# Patient Record
Sex: MALE | Race: WHITE | Employment: FULL TIME | URBAN - METROPOLITAN AREA
[De-identification: names, ages, dates, MRNs, and addresses within clinical notes are randomized per-mention and may not be internally consistent; named-entity substitution may affect disease eponyms.]

---

## 2021-02-24 ENCOUNTER — APPOINTMENT (OUTPATIENT)
Dept: GENERAL RADIOLOGY | Age: 41
End: 2021-02-24
Attending: STUDENT IN AN ORGANIZED HEALTH CARE EDUCATION/TRAINING PROGRAM
Payer: OTHER MISCELLANEOUS

## 2021-02-24 ENCOUNTER — HOSPITAL ENCOUNTER (EMERGENCY)
Age: 41
Discharge: HOME | End: 2021-02-24
Attending: STUDENT IN AN ORGANIZED HEALTH CARE EDUCATION/TRAINING PROGRAM
Payer: OTHER MISCELLANEOUS

## 2021-02-24 VITALS
HEIGHT: 73 IN | OXYGEN SATURATION: 99 % | WEIGHT: 225 LBS | TEMPERATURE: 98 F | SYSTOLIC BLOOD PRESSURE: 123 MMHG | BODY MASS INDEX: 29.82 KG/M2 | RESPIRATION RATE: 16 BRPM | DIASTOLIC BLOOD PRESSURE: 78 MMHG | HEART RATE: 87 BPM

## 2021-02-24 DIAGNOSIS — S68.119A FINGER AMPUTATION, TRAUMATIC, INITIAL ENCOUNTER: Primary | ICD-10-CM

## 2021-02-24 DIAGNOSIS — S62.634B OPEN DISPLACED FRACTURE OF DISTAL PHALANX OF RIGHT RING FINGER, INITIAL ENCOUNTER: ICD-10-CM

## 2021-02-24 PROCEDURE — 99284 EMERGENCY DEPT VISIT MOD MDM: CPT

## 2021-02-24 PROCEDURE — 73130 X-RAY EXAM OF HAND: CPT | Performed by: STUDENT IN AN ORGANIZED HEALTH CARE EDUCATION/TRAINING PROGRAM

## 2021-02-24 PROCEDURE — 96361 HYDRATE IV INFUSION ADD-ON: CPT

## 2021-02-24 PROCEDURE — 96375 TX/PRO/DX INJ NEW DRUG ADDON: CPT

## 2021-02-24 PROCEDURE — 90471 IMMUNIZATION ADMIN: CPT

## 2021-02-24 PROCEDURE — 96374 THER/PROPH/DIAG INJ IV PUSH: CPT

## 2021-02-24 RX ORDER — HYDROCODONE BITARTRATE AND ACETAMINOPHEN 5; 325 MG/1; MG/1
2 TABLET ORAL ONCE
Status: COMPLETED | OUTPATIENT
Start: 2021-02-24 | End: 2021-02-24

## 2021-02-24 RX ORDER — CEPHALEXIN 500 MG/1
500 CAPSULE ORAL 4 TIMES DAILY
Qty: 40 CAPSULE | Refills: 0 | Status: SHIPPED | OUTPATIENT
Start: 2021-02-24 | End: 2021-03-06

## 2021-02-24 RX ORDER — 0.9 % SODIUM CHLORIDE 0.9 %
INTRAVENOUS SOLUTION INTRAVENOUS
Status: COMPLETED
Start: 2021-02-24 | End: 2021-02-24

## 2021-02-24 RX ORDER — AMLODIPINE BESYLATE 5 MG/1
5 TABLET ORAL DAILY
COMMUNITY
End: 2021-02-25

## 2021-02-24 RX ORDER — HYDROCODONE BITARTRATE AND ACETAMINOPHEN 5; 325 MG/1; MG/1
1-2 TABLET ORAL EVERY 6 HOURS PRN
Qty: 10 TABLET | Refills: 0 | Status: SHIPPED | OUTPATIENT
Start: 2021-02-24 | End: 2021-03-03

## 2021-02-24 RX ORDER — MORPHINE SULFATE 4 MG/ML
4 INJECTION, SOLUTION INTRAMUSCULAR; INTRAVENOUS ONCE
Status: DISCONTINUED | OUTPATIENT
Start: 2021-02-24 | End: 2021-02-24

## 2021-02-24 RX ORDER — MORPHINE SULFATE 4 MG/ML
8 INJECTION, SOLUTION INTRAMUSCULAR; INTRAVENOUS ONCE
Status: COMPLETED | OUTPATIENT
Start: 2021-02-24 | End: 2021-02-24

## 2021-02-24 RX ORDER — CEFAZOLIN SODIUM 1 G/3ML
INJECTION, POWDER, FOR SOLUTION INTRAMUSCULAR; INTRAVENOUS
Status: COMPLETED
Start: 2021-02-24 | End: 2021-02-24

## 2021-02-24 RX ORDER — ONDANSETRON 2 MG/ML
4 INJECTION INTRAMUSCULAR; INTRAVENOUS ONCE
Status: COMPLETED | OUTPATIENT
Start: 2021-02-24 | End: 2021-02-24

## 2021-02-24 NOTE — ED INITIAL ASSESSMENT (HPI)
4th and 5th digit on r hand got caught between metal coils- avulsion of portion of 5th digit- large/deep lacs to r 4th digit

## 2021-02-24 NOTE — ED NOTES
Supervisor here and drug screening is required per stat list. Spoke with mobile solutions and info given. Will call back with ETA.

## 2021-02-24 NOTE — ED PROVIDER NOTES
Patient Seen in: THE [facility] In [place]      History   Patient presents with:  Trauma    Stated Complaint: right 4th and 5th finger  partial amputation    HPI/Subjective:   HPI    Patient is a 42-year-old male presenting the emergency Extremities: Right hand with amputation of the distal fifth digit and significant maceration to the distal fourth digit with the distal component being insensate. Minimal oozing, no arterial bleeding.           ED Course   Labs Reviewed - No data to displa Extensive differential was considered. Imaging was obtained. Case discussed with Ortho, they recommend the patient follow-up with Dr. Luanne Greco, hand surgery in the office. We will likely get him in tomorrow. Patient given phone number.   They agreed with pr

## 2021-02-25 ENCOUNTER — ANESTHESIA EVENT (OUTPATIENT)
Dept: SURGERY | Facility: HOSPITAL | Age: 41
End: 2021-02-25
Payer: OTHER MISCELLANEOUS

## 2021-02-25 ENCOUNTER — OFFICE VISIT (OUTPATIENT)
Dept: ORTHOPEDICS CLINIC | Facility: CLINIC | Age: 41
End: 2021-02-25

## 2021-02-25 VITALS — OXYGEN SATURATION: 99 % | HEART RATE: 81 BPM

## 2021-02-25 DIAGNOSIS — S68.119A AMPUTATION OF TIP OF FINGER, INITIAL ENCOUNTER: Primary | ICD-10-CM

## 2021-02-25 PROCEDURE — 99204 OFFICE O/P NEW MOD 45 MIN: CPT | Performed by: ORTHOPAEDIC SURGERY

## 2021-02-25 RX ORDER — ATORVASTATIN CALCIUM 20 MG/1
20 TABLET, FILM COATED ORAL NIGHTLY
COMMUNITY

## 2021-02-25 RX ORDER — HYDROCODONE BITARTRATE AND ACETAMINOPHEN 5; 325 MG/1; MG/1
1 TABLET ORAL EVERY 6 HOURS PRN
Qty: 30 TABLET | Refills: 0 | Status: SHIPPED | OUTPATIENT
Start: 2021-02-25 | End: 2021-03-08

## 2021-02-25 RX ORDER — ACETAMINOPHEN 500 MG
1000 TABLET ORAL ONCE
Status: CANCELLED | OUTPATIENT
Start: 2021-02-25 | End: 2021-02-25

## 2021-02-25 RX ORDER — AMLODIPINE BESYLATE AND BENAZEPRIL HYDROCHLORIDE 10; 20 MG/1; MG/1
1 CAPSULE ORAL DAILY
COMMUNITY

## 2021-02-25 NOTE — H&P (VIEW-ONLY)
[facility] New Patient Note    CC: Right ring finger and small finger injury    DOI: 2/24/2021    HPI: This 35year old male with right ring finger and small finger injury while at work. He works at a [employer]. He works as a . Head: Normocephalic. Eyes: Pupils are equal, round, and reactive to light. Conjunctivae and EOM are normal. No scleral icterus. Neck: Normal range of motion. No obvious masses  Cardiovascular: Normal rate and intact distal pulses.    Pulmonary/Chest: Ef 35year old male with right ring finger nailbed injury and distal phalanx fracture. I think the amount of hematoma underneath the nail plate warrants nail plate removal and repair of a nailbed laceration.   He also has a complex laceration over the volar t

## 2021-02-25 NOTE — PROGRESS NOTES
Baldomero Bills III's case has been scheduled/rescheduled at [phone] on 2/26/2021 at [facility]  Received:  Today  Message Contents   Naye Gimenez [place], [place]             Patient Name: Kaia Gray  MRN: [id number]     [place]

## 2021-02-25 NOTE — PROGRESS NOTES
Surgeon:Dr. Leslee العراقي   Date of Surgery:2/26/21  Facility: [facility]       [facility], scheduling sheet to referral team?:N/A  Clearance needed:No        If yes, requested? :N/A  Post-op Appt:3/8/21 1142

## 2021-02-25 NOTE — PROGRESS NOTES
OR BOOKING SHEET  Name: Leslee Briceño III  MRN: [id number]   : 1980  Diagnosis:  [x] Amputation of tip of finger, initial encounter [B98.390F]    Disposition:    [x] Outpatient  [] Overnight for BRITNEY  [] Overnight for observation and pain contr

## 2021-02-26 ENCOUNTER — HOSPITAL ENCOUNTER (OUTPATIENT)
Facility: HOSPITAL | Age: 41
Setting detail: HOSPITAL OUTPATIENT SURGERY
Discharge: HOME | End: 2021-02-26
Attending: ORTHOPAEDIC SURGERY | Admitting: ORTHOPAEDIC SURGERY
Payer: OTHER MISCELLANEOUS

## 2021-02-26 ENCOUNTER — LAB ENCOUNTER (OUTPATIENT)
Dept: LAB | Age: 41
End: 2021-02-26
Attending: ORTHOPAEDIC SURGERY
Payer: OTHER MISCELLANEOUS

## 2021-02-26 ENCOUNTER — ANESTHESIA (OUTPATIENT)
Dept: SURGERY | Facility: HOSPITAL | Age: 41
End: 2021-02-26
Payer: OTHER MISCELLANEOUS

## 2021-02-26 VITALS
BODY MASS INDEX: 30.68 KG/M2 | DIASTOLIC BLOOD PRESSURE: 89 MMHG | WEIGHT: 231.5 LBS | RESPIRATION RATE: 13 BRPM | OXYGEN SATURATION: 95 % | HEIGHT: 73 IN | SYSTOLIC BLOOD PRESSURE: 125 MMHG | TEMPERATURE: 97 F | HEART RATE: 79 BPM

## 2021-02-26 DIAGNOSIS — Z01.818 PRE-OP TESTING: Primary | ICD-10-CM

## 2021-02-26 DIAGNOSIS — S68.119A AMPUTATION OF TIP OF FINGER, INITIAL ENCOUNTER: ICD-10-CM

## 2021-02-26 DIAGNOSIS — Z01.818 PRE-OP TESTING: ICD-10-CM

## 2021-02-26 LAB
ANION GAP SERPL CALC-SCNC: 5 MMOL/L (ref 0–18)
ATRIAL RATE: 74 BPM
BUN BLD-MCNC: 13 MG/DL (ref 7–18)
BUN/CREAT SERPL: 13.1 (ref 10–20)
CALCIUM BLD-MCNC: 9.4 MG/DL (ref 8.5–10.1)
CHLORIDE SERPL-SCNC: 105 MMOL/L (ref 98–112)
CO2 SERPL-SCNC: 28 MMOL/L (ref 21–32)
CREAT BLD-MCNC: 0.99 MG/DL
GLUCOSE BLD-MCNC: 89 MG/DL (ref 70–99)
OSMOLALITY SERPL CALC.SUM OF ELEC: 286 MOSM/KG (ref 275–295)
P AXIS: 45 DEGREES
P-R INTERVAL: 166 MS
POTASSIUM SERPL-SCNC: 3.9 MMOL/L (ref 3.5–5.1)
Q-T INTERVAL: 372 MS
QRS DURATION: 94 MS
QTC CALCULATION (BEZET): 412 MS
R AXIS: 9 DEGREES
SARS-COV-2 RNA RESP QL NAA+PROBE: NOT DETECTED
SODIUM SERPL-SCNC: 138 MMOL/L (ref 136–145)
T AXIS: 26 DEGREES
VENTRICULAR RATE: 74 BPM

## 2021-02-26 PROCEDURE — 0PBT0ZZ EXCISION OF RIGHT FINGER PHALANX, OPEN APPROACH: ICD-10-PCS | Performed by: ORTHOPAEDIC SURGERY

## 2021-02-26 PROCEDURE — 11012 DEB SKIN BONE AT FX SITE: CPT | Performed by: ORTHOPAEDIC SURGERY

## 2021-02-26 PROCEDURE — 26765 TREAT FINGER FRACTURE EACH: CPT | Performed by: ORTHOPAEDIC SURGERY

## 2021-02-26 PROCEDURE — 12002 RPR S/N/AX/GEN/TRNK2.6-7.5CM: CPT | Performed by: ORTHOPAEDIC SURGERY

## 2021-02-26 PROCEDURE — 14040 TIS TRNFR F/C/C/M/N/A/G/H/F: CPT | Performed by: ORTHOPAEDIC SURGERY

## 2021-02-26 PROCEDURE — 0HXFXZZ TRANSFER RIGHT HAND SKIN, EXTERNAL APPROACH: ICD-10-PCS | Performed by: ORTHOPAEDIC SURGERY

## 2021-02-26 PROCEDURE — 0HQQXZZ REPAIR FINGER NAIL, EXTERNAL APPROACH: ICD-10-PCS | Performed by: ORTHOPAEDIC SURGERY

## 2021-02-26 PROCEDURE — 11760 REPAIR OF NAIL BED: CPT | Performed by: ORTHOPAEDIC SURGERY

## 2021-02-26 RX ORDER — ONDANSETRON 2 MG/ML
4 INJECTION INTRAMUSCULAR; INTRAVENOUS AS NEEDED
Status: DISCONTINUED | OUTPATIENT
Start: 2021-02-26 | End: 2021-02-26

## 2021-02-26 RX ORDER — SODIUM CHLORIDE, SODIUM LACTATE, POTASSIUM CHLORIDE, CALCIUM CHLORIDE 600; 310; 30; 20 MG/100ML; MG/100ML; MG/100ML; MG/100ML
INJECTION, SOLUTION INTRAVENOUS CONTINUOUS
Status: DISCONTINUED | OUTPATIENT
Start: 2021-02-26 | End: 2021-02-26

## 2021-02-26 RX ORDER — HYDROCODONE BITARTRATE AND ACETAMINOPHEN 5; 325 MG/1; MG/1
2 TABLET ORAL AS NEEDED
Status: COMPLETED | OUTPATIENT
Start: 2021-02-26 | End: 2021-02-26

## 2021-02-26 RX ORDER — NALOXONE HYDROCHLORIDE 0.4 MG/ML
80 INJECTION, SOLUTION INTRAMUSCULAR; INTRAVENOUS; SUBCUTANEOUS AS NEEDED
Status: DISCONTINUED | OUTPATIENT
Start: 2021-02-26 | End: 2021-02-26

## 2021-02-26 RX ORDER — HYDROMORPHONE HYDROCHLORIDE 1 MG/ML
0.4 INJECTION, SOLUTION INTRAMUSCULAR; INTRAVENOUS; SUBCUTANEOUS EVERY 5 MIN PRN
Status: DISCONTINUED | OUTPATIENT
Start: 2021-02-26 | End: 2021-02-26

## 2021-02-26 RX ORDER — HYDROCODONE BITARTRATE AND ACETAMINOPHEN 5; 325 MG/1; MG/1
1 TABLET ORAL AS NEEDED
Status: COMPLETED | OUTPATIENT
Start: 2021-02-26 | End: 2021-02-26

## 2021-02-26 RX ORDER — METOCLOPRAMIDE HYDROCHLORIDE 5 MG/ML
10 INJECTION INTRAMUSCULAR; INTRAVENOUS AS NEEDED
Status: DISCONTINUED | OUTPATIENT
Start: 2021-02-26 | End: 2021-02-26

## 2021-02-26 RX ORDER — MIDAZOLAM HYDROCHLORIDE 1 MG/ML
INJECTION INTRAMUSCULAR; INTRAVENOUS AS NEEDED
Status: DISCONTINUED | OUTPATIENT
Start: 2021-02-26 | End: 2021-02-26 | Stop reason: SURG

## 2021-02-26 RX ORDER — ACETAMINOPHEN 500 MG
1000 TABLET ORAL EVERY 6 HOURS PRN
COMMUNITY

## 2021-02-26 RX ORDER — GLYCOPYRROLATE 0.2 MG/ML
INJECTION, SOLUTION INTRAMUSCULAR; INTRAVENOUS AS NEEDED
Status: DISCONTINUED | OUTPATIENT
Start: 2021-02-26 | End: 2021-02-26 | Stop reason: SURG

## 2021-02-26 RX ORDER — CEFAZOLIN SODIUM/WATER 2 G/20 ML
2 SYRINGE (ML) INTRAVENOUS ONCE
Status: COMPLETED | OUTPATIENT
Start: 2021-02-26 | End: 2021-02-26

## 2021-02-26 RX ORDER — LIDOCAINE HYDROCHLORIDE 10 MG/ML
INJECTION, SOLUTION INFILTRATION; PERINEURAL AS NEEDED
Status: DISCONTINUED | OUTPATIENT
Start: 2021-02-26 | End: 2021-02-26 | Stop reason: HOSPADM

## 2021-02-26 RX ORDER — LIDOCAINE HYDROCHLORIDE 10 MG/ML
INJECTION, SOLUTION EPIDURAL; INFILTRATION; INTRACAUDAL; PERINEURAL AS NEEDED
Status: DISCONTINUED | OUTPATIENT
Start: 2021-02-26 | End: 2021-02-26 | Stop reason: SURG

## 2021-02-26 RX ORDER — BUPIVACAINE HYDROCHLORIDE 5 MG/ML
INJECTION, SOLUTION EPIDURAL; INTRACAUDAL AS NEEDED
Status: DISCONTINUED | OUTPATIENT
Start: 2021-02-26 | End: 2021-02-26 | Stop reason: HOSPADM

## 2021-02-26 RX ORDER — ACETAMINOPHEN 500 MG
1000 TABLET ORAL ONCE AS NEEDED
Status: DISCONTINUED | OUTPATIENT
Start: 2021-02-26 | End: 2021-02-26

## 2021-02-26 RX ADMIN — MIDAZOLAM HYDROCHLORIDE 2 MG: 1 INJECTION INTRAMUSCULAR; INTRAVENOUS at 14:26:00

## 2021-02-26 RX ADMIN — LIDOCAINE HYDROCHLORIDE 50 MG: 10 INJECTION, SOLUTION EPIDURAL; INFILTRATION; INTRACAUDAL; PERINEURAL at 14:27:00

## 2021-02-26 RX ADMIN — GLYCOPYRROLATE 0.1 MG: 0.2 INJECTION, SOLUTION INTRAMUSCULAR; INTRAVENOUS at 14:27:00

## 2021-02-26 RX ADMIN — CEFAZOLIN SODIUM/WATER 2 G: 2 G/20 ML SYRINGE (ML) INTRAVENOUS at 14:30:00

## 2021-02-26 RX ADMIN — SODIUM CHLORIDE, SODIUM LACTATE, POTASSIUM CHLORIDE, CALCIUM CHLORIDE: 600; 310; 30; 20 INJECTION, SOLUTION INTRAVENOUS at 16:05:00

## 2021-02-26 NOTE — ANESTHESIA POSTPROCEDURE EVALUATION
[facility] [place] Patient Status:  Hospital Outpatient Surgery   Age/Gender 35year old male MRN [id number]   Location [address] [place] [address] Attending Kami Rizo MD   Hosp Day # 0 PCP PHYSICIAN BUDDYTAFF       Luc

## 2021-02-26 NOTE — OPERATIVE REPORT
Operative Note    Patient Name: Reynaldo Vidal III    Preoperative Diagnosis:     1. Right ring fingertip injury  2.  Right small finger amputation    Postoperative Diagnosis: same    Primary Surgeon: Maya Horn    Assistant: None    Procedures:     R Procedure: Patient was met in the preoperative holding area where consent was verified, H&P was updated, and the digits were marked with the surgeon's initial.  Patient was brought to the operating room placed in supine position with a right arm table.   [place] [employer] Next, the finger tourniquet was placed onto the small finger. The fingertip was irrigated and debrided of any hematoma and any devitalized tissue was sharply excised with a 15 blade. A rongeur was used to rongeur back any visible bone.   There was a nailb

## 2021-02-26 NOTE — BRIEF OP NOTE
Pre-Operative Diagnosis: RIGHT RING AND SMALL FIINGER INJURY     Post-Operative Diagnosis: RIGHT RING AND SMALL FINGER INJURY      Procedure Performed:   Procedure(s):  RIGHT RING FINGER NAIL PLATE REMOVAL, NAILBED REPAIR, RIGHT RING FINGER IRRIGATION AND

## 2021-02-26 NOTE — ANESTHESIA PREPROCEDURE EVALUATION
PRE-OP EVALUATION    Patient Name: Ana Kerns III    Pre-op Diagnosis: Amputation of tip of finger, initial encounter [S68.593A]    Procedure(s):  RIGHT RING FINGER NAIL PLATE REMOVAL, NAILBED REPAIR, AND LACERATION REPAIR, RIGHT RING FINGER Elena Gillis patient. The consent was signed without further questions.        Present on Admission:  **None**

## 2021-02-26 NOTE — ANESTHESIA PREPROCEDURE EVALUATION
PRE-OP EVALUATION    Patient Name: Toyin Hammond III    Pre-op Diagnosis: RIGHT RING AND SMALL FIINGER INJURY    Procedure(s):  RIGHT RING FINGER NAIL PLATE REMOVAL, NAILBED REPAIR, AND LACERATION REPAIR, RIGHT RING FINGER IRRIGATION AND DEBRIDEMENT T 0600    •  amLODIPine Besy-Benazepril HCl 10-20 MG Oral Cap, Take 1 capsule by mouth daily. , Disp: , Rfl: , 2/26/2021 at 0600    •  HYDROcodone-acetaminophen (NORCO) 5-325 MG Oral Tab, Take 1 tablet by mouth every 6 (six) hours as needed for Pain., Disp: 3 opening: 3 FB  TM distance: 4 - 6 cm  Neck ROM: full Cardiovascular      Rhythm: regular  Rate: normal     Dental             Pulmonary      Breath sounds clear to auscultation bilaterally.                Other findings            ASA: 2   Plan: MAC  NPO st

## 2021-03-08 ENCOUNTER — TELEPHONE (OUTPATIENT)
Dept: ORTHOPEDICS CLINIC | Facility: CLINIC | Age: 41
End: 2021-03-08

## 2021-03-08 ENCOUNTER — OFFICE VISIT (OUTPATIENT)
Dept: ORTHOPEDICS CLINIC | Facility: CLINIC | Age: 41
End: 2021-03-08
Payer: OTHER MISCELLANEOUS

## 2021-03-08 VITALS — HEART RATE: 76 BPM | OXYGEN SATURATION: 100 %

## 2021-03-08 DIAGNOSIS — S68.119A AMPUTATION OF TIP OF FINGER, INITIAL ENCOUNTER: Primary | ICD-10-CM

## 2021-03-08 PROCEDURE — 99024 POSTOP FOLLOW-UP VISIT: CPT | Performed by: ORTHOPAEDIC SURGERY

## 2021-03-08 RX ORDER — SULFAMETHOXAZOLE AND TRIMETHOPRIM 800; 160 MG/1; MG/1
1 TABLET ORAL 2 TIMES DAILY
Qty: 14 TABLET | Refills: 0 | Status: SHIPPED | OUTPATIENT
Start: 2021-03-08 | End: 2021-03-15

## 2021-03-08 RX ORDER — HYDROCODONE BITARTRATE AND ACETAMINOPHEN 5; 325 MG/1; MG/1
1 TABLET ORAL EVERY 6 HOURS PRN
Qty: 20 TABLET | Refills: 0 | Status: SHIPPED | OUTPATIENT
Start: 2021-03-08

## 2021-03-08 NOTE — PROGRESS NOTES
[facility] Visit    A/P: 35year old male s/p     Right Ring Finger  1. Closed treatment of right ring finger distal phalanx fracture  2. Irrigation and debridement of right ring finger distal phalanx fracture  3.   Right ring finger nail pl

## 2021-03-15 ENCOUNTER — OFFICE VISIT (OUTPATIENT)
Dept: ORTHOPEDICS CLINIC | Facility: CLINIC | Age: 41
End: 2021-03-15
Payer: OTHER MISCELLANEOUS

## 2021-03-15 VITALS — HEART RATE: 88 BPM | OXYGEN SATURATION: 98 %

## 2021-03-15 DIAGNOSIS — S68.119A AMPUTATION OF TIP OF FINGER, INITIAL ENCOUNTER: Primary | ICD-10-CM

## 2021-03-15 PROCEDURE — 99024 POSTOP FOLLOW-UP VISIT: CPT | Performed by: ORTHOPAEDIC SURGERY

## 2021-03-15 RX ORDER — HYDROCODONE BITARTRATE AND ACETAMINOPHEN 5; 325 MG/1; MG/1
1 TABLET ORAL EVERY 6 HOURS PRN
Qty: 30 TABLET | Refills: 0 | Status: SHIPPED | OUTPATIENT
Start: 2021-03-15

## 2021-04-01 ENCOUNTER — MED REC SCAN ONLY (OUTPATIENT)
Dept: ORTHOPEDICS CLINIC | Facility: CLINIC | Age: 41
End: 2021-04-01

## 2021-04-05 ENCOUNTER — OFFICE VISIT (OUTPATIENT)
Dept: ORTHOPEDICS CLINIC | Facility: CLINIC | Age: 41
End: 2021-04-05
Payer: OTHER MISCELLANEOUS

## 2021-04-05 VITALS — OXYGEN SATURATION: 99 % | HEART RATE: 82 BPM

## 2021-04-05 DIAGNOSIS — S68.119A AMPUTATION OF TIP OF FINGER, INITIAL ENCOUNTER: Primary | ICD-10-CM

## 2021-04-05 PROCEDURE — 99024 POSTOP FOLLOW-UP VISIT: CPT | Performed by: ORTHOPAEDIC SURGERY

## 2021-05-03 ENCOUNTER — OFFICE VISIT (OUTPATIENT)
Dept: ORTHOPEDICS CLINIC | Facility: CLINIC | Age: 41
End: 2021-05-03
Payer: OTHER MISCELLANEOUS

## 2021-05-03 VITALS — HEART RATE: 93 BPM | OXYGEN SATURATION: 99 %

## 2021-05-03 DIAGNOSIS — S68.119A AMPUTATION OF TIP OF FINGER, INITIAL ENCOUNTER: Primary | ICD-10-CM

## 2021-05-03 PROCEDURE — 99024 POSTOP FOLLOW-UP VISIT: CPT | Performed by: ORTHOPAEDIC SURGERY

## 2021-06-04 ENCOUNTER — TELEPHONE (OUTPATIENT)
Dept: ORTHOPEDICS CLINIC | Facility: CLINIC | Age: 41
End: 2021-06-04

## 2021-06-04 NOTE — TELEPHONE ENCOUNTER
Request received for 5/3/21.     Faxed notes to workers comp  at [phone]. Received fax confirmation.

## 2021-06-15 ENCOUNTER — TELEPHONE (OUTPATIENT)
Dept: ORTHOPEDICS CLINIC | Facility: CLINIC | Age: 41
End: 2021-06-15

## 2021-06-15 NOTE — TELEPHONE ENCOUNTER
Request received for 4/5/21.     Faxed notes to workers comp  at [phone]. Received fax confirmation.

## 2021-08-02 ENCOUNTER — OFFICE VISIT (OUTPATIENT)
Dept: ORTHOPEDICS CLINIC | Facility: CLINIC | Age: 41
End: 2021-08-02
Payer: OTHER MISCELLANEOUS

## 2021-08-02 DIAGNOSIS — S68.119A AMPUTATION OF TIP OF FINGER, INITIAL ENCOUNTER: Primary | ICD-10-CM

## 2021-08-02 PROCEDURE — 99213 OFFICE O/P EST LOW 20 MIN: CPT | Performed by: ORTHOPAEDIC SURGERY

## (undated) DEVICE — SOL  .9 1000ML BTL

## (undated) DEVICE — Device

## (undated) DEVICE — STERILE POLYISOPRENE POWDER-FREE SURGICAL GLOVES: Brand: PROTEXIS

## (undated) DEVICE — KENDALL SCD EXPRESS SLEEVES, KNEE LENGTH, MEDIUM: Brand: KENDALL SCD

## (undated) DEVICE — DISPOSABLE BIPOLAR FORCEPS 4" (10.2CM) JEWELERS, STRAIGHT 0.4MM TIP AND 12 FT. (3.6M) CABLE: Brand: KIRWAN

## (undated) DEVICE — GAUZE SPONGES,8 PLY: Brand: CURITY

## (undated) DEVICE — UPPER EXTREMITY CDS-LF: Brand: MEDLINE INDUSTRIES, INC.

## (undated) DEVICE — HYDROGEN PEROXIDE SOL 4 OZ

## (undated) DEVICE — BANDAGE COBAN STERILE 1IN

## (undated) DEVICE — STERILE POLYISOPRENE POWDER-FREE SURGICAL GLOVES WITH EMOLLIENT COATING: Brand: PROTEXIS

## (undated) DEVICE — SOL  .9 3000ML

## (undated) DEVICE — GOWN,SIRUS,FABRIC-REINFORCED,X-LARGE: Brand: MEDLINE

## (undated) DEVICE — NON-ADHERENT STRIPS,OIL EMULSION: Brand: CURITY

## (undated) DEVICE — PREMIUM WET SKIN PREP TRAY: Brand: MEDLINE INDUSTRIES, INC.

## (undated) NOTE — LETTER
Date: 2/25/2021    Patient Name: Leandro Saldana          To Whom it may concern: The above patient was seen at the [facility] for treatment of a medical condition. Patient should remain off work until March 5th.  Patient to be se

## (undated) NOTE — LETTER
Date: 3/15/2021    Patient Name: Timothy Hernandez          To Whom it may concern: The above patient was seen at the [facility] for treatment of a medical condition. Patient should be restricted to sedentary work only.  No use of r

## (undated) NOTE — LETTER
Date: 4/5/2021    Patient Name: Alcon España          To Whom it may concern: The above patient was seen at the [facility] for treatment of a medical condition. Patient should be restricted to sedentary work only.  No use of ri

## (undated) NOTE — LETTER
Date: 5/3/2021    Patient Name: Jodee Coy          To Whom it may concern: The above patient was seen at the [facility] for treatment of a medical condition. Patient can return to work without restrictions.  Patient to be re

## (undated) NOTE — LETTER
Date: 8/2/2021    Patient Name: Jody Bahena          To Whom it may concern: The above patient was seen at the [facility] for treatment of a medical condition. Patient is MMI.        Sincerely,    Du Berger MD  Hand, Wrist